# Patient Record
Sex: MALE
[De-identification: names, ages, dates, MRNs, and addresses within clinical notes are randomized per-mention and may not be internally consistent; named-entity substitution may affect disease eponyms.]

---

## 2023-03-26 ENCOUNTER — NON-APPOINTMENT (OUTPATIENT)
Age: 23
End: 2023-03-26

## 2023-12-27 ENCOUNTER — APPOINTMENT (OUTPATIENT)
Dept: ORTHOPEDIC SURGERY | Facility: CLINIC | Age: 23
End: 2023-12-27
Payer: COMMERCIAL

## 2023-12-27 VITALS
HEIGHT: 72 IN | DIASTOLIC BLOOD PRESSURE: 73 MMHG | WEIGHT: 205 LBS | HEART RATE: 85 BPM | SYSTOLIC BLOOD PRESSURE: 134 MMHG | BODY MASS INDEX: 27.77 KG/M2

## 2023-12-27 DIAGNOSIS — Z78.9 OTHER SPECIFIED HEALTH STATUS: ICD-10-CM

## 2023-12-27 PROBLEM — Z00.00 ENCOUNTER FOR PREVENTIVE HEALTH EXAMINATION: Status: ACTIVE | Noted: 2023-12-27

## 2023-12-27 PROCEDURE — 99204 OFFICE O/P NEW MOD 45 MIN: CPT

## 2023-12-27 PROCEDURE — 72080 X-RAY EXAM THORACOLMB 2/> VW: CPT

## 2023-12-27 NOTE — HISTORY OF PRESENT ILLNESS
[de-identified] : Chief Complaint: Low back pain   History of Present Illness: 23-year-old male presenting today for initial evaluation for severe mid back pain.  Patient states that about 2 to 3 weeks ago he suffered a lifting injury while squatting mainly isolated to the thoracolumbar spine paraspinal muscles and no radiation in a radicular fashion down his lower extremities or through his waist or to the part of his chest.  He states that 5 days ago he was doing legs again and again felt severe worsening thoracolumbar back pain around the T10-L2 level which forced him to stop working out since then he is having severe pain in that area difficulty standing straight up and direct he states that he wakes up and has significant stiffness in his mid back difficulty standing upright and getting out of bed getting in and out of a car.  He also has difficulty with standing and bending over picking up objects.  Is been unable to go back to the gym and is causing him significant discomfort.  He has been taking over-the-counter Tylenol and Aleve and states that he has had some improvement in his symptoms however they are still severe and debilitating.   Past medical history, past surgical history, medications, allergies, social history, and family history are as documented in our records today.  Notable items include: None   Review of Systems: I have reviewed the patient's documented Review of Systems data today, I concur with this documentation.

## 2023-12-27 NOTE — ASSESSMENT
[FreeTextEntry1] : 23-year-old male with likely annular tear throughout the lumbar spine  Today Johan and I reviewed his physical exam findings, signs and symptoms as well as his x-rays today in the office.  I believe that he is suffering from an annular tear likely in his upper lumbar spine which is causing him severe discomfort and inability to fully stand upright.  He is having no radicular symptoms but he does have a positive straight leg raise test and I am less likely to believe that he has a true radiculopathy at this time.  I discussed with him the natural history of annular tear as well as the relevant anatomy and the treatment options surrounding this moving forward.  Trigger point injection was provided today in the office patient had significant relief of his symptoms this was performed bilaterally the paraspinal muscles.

## 2023-12-27 NOTE — PHYSICAL EXAM
[de-identified] : CONSTITUTIONAL: Patient is a very pleasant individual who is well-nourished and appears stated age. PSYCHIATRIC: Alert and oriented times three and in no apparent distress, and participates with orthopedic evaluation well. HEAD: Atraumatic and nonsyndromic in appearance. EENT: No thyromegaly, EOMI. RESPIRATORY: Respiratory rate is regular, not dyspneic on examination. LYMPHATICS: There is no cervical or axillary lymphadenopathy. INTEGUMENTARY: Skin is clean, dry, and intact to bilateral lower extremities. VASCULAR: There is brisk capillary refill about the bilateral Lower Extremities with 2+ DP Pulse  Palpation: There is significant tenderness to palpation in the thoracic or lumbar spine from T10 down to L2 mainly paraspinal with there is significant bilateral paraspinal muscle hypertonicity There is no pain with internal/external range of motion of bilateral hips Muscle Strength Testing: Hip Flexion: 5/5 B/L Knee Extension: 5/5 B/L Knee Flexion: 5/5 B/L Dorsiflexion: 5/5 B/L EHL: 5/5 B/L Plantarflexion: 5/5 B/L  Sensation: SILT L2-S1 B/L except: None  Reflexes: 2+ Quadriceps/Achilles  Gait: Normal gait w/o assistance Able to perform tandem gait Able to Heel Walk Able to Toe Walk  Special Testing:  Positive straight leg raise test to left lower extremity causing severe low back pain Mildly positive straight leg raise test to right lower extremity causing low back Negative clonus BLLE  [de-identified] : Standing AP, lateral,  radiographs of the thoracolumbar spine performed on 12/27/2023 in the Radiology Department at [facility] [place] for the indication of back pain are reviewed.  These studies demonstrate there is no significant deformity on AP film, there is well-maintained lumbar lordosis and sagittal balance, there is signs of a transitional segment with a lumbarized S1.  There is mild kyphotic deformity in the thoracic spine measuring about 65 degrees no signs of disc space degeneration or endplate irregularity

## 2023-12-27 NOTE — DISCUSSION/SUMMARY
[de-identified] : Medrol Dosepak He will discontinue his Aleve on the Medrol Dosepak and he will restart as needed once the Medrol Dosepak is completed Tylenol up to 3000 mg daily as needed All modalities such as lidocaine patches, stretching, warm soaks and showers ice I will see him back in 2 weeks to see how he is doing

## 2023-12-27 NOTE — PROCEDURE
[FreeTextEntry1] : Verbal consent was obtained and all questions, comments and concerns were addressed.  A bilateral paraspinal in the thoracolumbar area trigger point injection into superficial muscular trigger point was cleansed with alcohol prep X 3, ethyl chloride was used as local anesthetic. Under sterile precautions 1cc of 1 percent lidocaine without epinephrine, plus 10 mg depomedrol, and 1 mL of 30 mg/mL of Toradol were instilled into the affected trigger points. Patient tolerated procedure well. Dry sterile dressing was placed and patient described relief of pain 5 minutes after procedure was performed.

## 2024-01-10 ENCOUNTER — APPOINTMENT (OUTPATIENT)
Dept: ORTHOPEDIC SURGERY | Facility: CLINIC | Age: 24
End: 2024-01-10
Payer: COMMERCIAL

## 2024-01-10 VITALS
HEART RATE: 83 BPM | DIASTOLIC BLOOD PRESSURE: 69 MMHG | SYSTOLIC BLOOD PRESSURE: 145 MMHG | HEIGHT: 72 IN | WEIGHT: 205 LBS | BODY MASS INDEX: 27.77 KG/M2

## 2024-01-10 DIAGNOSIS — M51.9 UNSPECIFIED THORACIC, THORACOLUMBAR AND LUMBOSACRAL INTERVERTEBRAL DISC DISORDER: ICD-10-CM

## 2024-01-10 PROCEDURE — 99212 OFFICE O/P EST SF 10 MIN: CPT

## 2024-01-10 RX ORDER — METHYLPREDNISOLONE 4 MG/1
4 TABLET ORAL
Qty: 1 | Refills: 0 | Status: COMPLETED | COMMUNITY
Start: 2023-12-27 | End: 2024-01-10

## 2024-01-10 NOTE — HISTORY OF PRESENT ILLNESS
[de-identified] : Chief Complaint: Low back pain   History of Present Illness: 23-year-old male presenting today for his 2-week follow-up visit.  During his last visit he was in severe thoracolumbar pain having difficulty with sitting upright and having difficulty standing erect due to severe pain and spasm in his back.  At that time we had tried a trigger point injection as well as a Medrol Dosepak.  He states that the injection Medrol Dosepak significant proved his symptoms he is back at the gym and able to work out but has not done any bent over weighted activities or loadbearing activities.  He states that he is happy with his current level of treatment happy with his current pain control and his improvement.   Past medical history, past surgical history, medications, allergies, social history, and family history are as documented in our records today.  Notable items include: None   Review of Systems: I have reviewed the patient's documented Review of Systems data today, I concur with this documentation.

## 2024-01-10 NOTE — DISCUSSION/SUMMARY
[de-identified] : He responded well to the injection medications I discussed them that he can progress fully into his normal routine but to limit heavy weightbearing exercises with weight out of front of him or with weight on his shoulders for the next 2 weeks and slowly progress back to his full weighted activities I also discussed the importance of proper posture Also discussed that he can begin yoga or Pilates to help with his flexibility in his core and paraspinal muscle strengthening He will follow-up with me on an as-needed basis or if his symptoms return